# Patient Record
Sex: FEMALE | Race: BLACK OR AFRICAN AMERICAN | NOT HISPANIC OR LATINO | Employment: FULL TIME | URBAN - METROPOLITAN AREA
[De-identification: names, ages, dates, MRNs, and addresses within clinical notes are randomized per-mention and may not be internally consistent; named-entity substitution may affect disease eponyms.]

---

## 2019-10-28 ENCOUNTER — OFFICE VISIT (OUTPATIENT)
Dept: RHEUMATOLOGY | Facility: CLINIC | Age: 49
End: 2019-10-28
Payer: COMMERCIAL

## 2019-10-28 VITALS
HEIGHT: 61 IN | HEART RATE: 83 BPM | BODY MASS INDEX: 33.76 KG/M2 | WEIGHT: 178.81 LBS | RESPIRATION RATE: 16 BRPM | DIASTOLIC BLOOD PRESSURE: 72 MMHG | OXYGEN SATURATION: 98 % | TEMPERATURE: 97 F | SYSTOLIC BLOOD PRESSURE: 154 MMHG

## 2019-10-28 DIAGNOSIS — M32.19 SYSTEMIC LUPUS ERYTHEMATOSUS WITH OTHER ORGAN INVOLVEMENT, UNSPECIFIED SLE TYPE: ICD-10-CM

## 2019-10-28 DIAGNOSIS — I73.00 RAYNAUD'S DISEASE WITHOUT GANGRENE: ICD-10-CM

## 2019-10-28 DIAGNOSIS — R76.8 SMITH ANTIBODY POSITIVE: ICD-10-CM

## 2019-10-28 DIAGNOSIS — M48.07 LUMBOSACRAL STENOSIS: ICD-10-CM

## 2019-10-28 DIAGNOSIS — M70.61 TROCHANTERIC BURSITIS OF BOTH HIPS: ICD-10-CM

## 2019-10-28 DIAGNOSIS — E50.7 XEROPHTHALMIA: ICD-10-CM

## 2019-10-28 DIAGNOSIS — M75.52 BURSITIS OF BOTH SHOULDERS: ICD-10-CM

## 2019-10-28 DIAGNOSIS — M75.51 BURSITIS OF BOTH SHOULDERS: ICD-10-CM

## 2019-10-28 DIAGNOSIS — M32.19 SYSTEMIC LUPUS ERYTHEMATOSUS WITH OTHER ORGAN INVOLVEMENT, UNSPECIFIED SLE TYPE: Primary | ICD-10-CM

## 2019-10-28 DIAGNOSIS — D72.819 LEUKOPENIA, UNSPECIFIED TYPE: ICD-10-CM

## 2019-10-28 DIAGNOSIS — M70.62 TROCHANTERIC BURSITIS OF BOTH HIPS: ICD-10-CM

## 2019-10-28 PROBLEM — M79.7 FIBROMYOSITIS: Status: ACTIVE | Noted: 2019-10-28

## 2019-10-28 PROBLEM — R80.9 PROTEINURIA: Status: ACTIVE | Noted: 2019-10-28

## 2019-10-28 PROBLEM — M75.50 BURSITIS OF SHOULDER: Status: ACTIVE | Noted: 2019-10-28

## 2019-10-28 PROBLEM — M70.70 BURSITIS OF HIP: Status: ACTIVE | Noted: 2019-10-28

## 2019-10-28 PROBLEM — L65.9 ALOPECIA: Status: ACTIVE | Noted: 2019-10-28

## 2019-10-28 PROBLEM — M54.17 LUMBOSACRAL RADICULOPATHY: Status: ACTIVE | Noted: 2019-10-28

## 2019-10-28 PROBLEM — M32.9 SYSTEMIC LUPUS ERYTHEMATOSUS: Status: ACTIVE | Noted: 2019-10-28

## 2019-10-28 PROCEDURE — 99214 OFFICE O/P EST MOD 30 MIN: CPT | Mod: S$GLB,,, | Performed by: INTERNAL MEDICINE

## 2019-10-28 PROCEDURE — 3008F BODY MASS INDEX DOCD: CPT | Mod: CPTII,S$GLB,, | Performed by: INTERNAL MEDICINE

## 2019-10-28 PROCEDURE — 3008F PR BODY MASS INDEX (BMI) DOCUMENTED: ICD-10-PCS | Mod: CPTII,S$GLB,, | Performed by: INTERNAL MEDICINE

## 2019-10-28 PROCEDURE — 99214 PR OFFICE/OUTPT VISIT, EST, LEVL IV, 30-39 MIN: ICD-10-PCS | Mod: S$GLB,,, | Performed by: INTERNAL MEDICINE

## 2019-10-28 RX ORDER — GABAPENTIN 100 MG/1
100 CAPSULE ORAL 3 TIMES DAILY
Qty: 90 CAPSULE | Refills: 4 | Status: SHIPPED | OUTPATIENT
Start: 2019-10-28 | End: 2020-01-21

## 2019-10-28 RX ORDER — BUPROPION HYDROCHLORIDE 300 MG/1
300 TABLET ORAL DAILY
Refills: 0 | COMMUNITY
Start: 2019-09-30

## 2019-10-28 RX ORDER — TRAZODONE HYDROCHLORIDE 50 MG/1
TABLET ORAL
COMMUNITY

## 2019-10-28 RX ORDER — BUTALBITAL, ACETAMINOPHEN AND CAFFEINE 50; 325; 40 MG/1; MG/1; MG/1
1 TABLET ORAL 3 TIMES DAILY PRN
Refills: 0 | COMMUNITY
Start: 2019-08-19

## 2019-10-28 RX ORDER — PREDNISONE 10 MG/1
10 TABLET ORAL DAILY
Qty: 30 TABLET | Refills: 4 | Status: SHIPPED | OUTPATIENT
Start: 2019-10-28 | End: 2019-11-27

## 2019-10-28 RX ORDER — HYDROXYCHLOROQUINE SULFATE 200 MG/1
200 TABLET, FILM COATED ORAL 2 TIMES DAILY
Qty: 60 TABLET | Refills: 4 | Status: SHIPPED | OUTPATIENT
Start: 2019-10-28 | End: 2020-01-21

## 2019-10-28 RX ORDER — PREDNISONE 10 MG/1
10 TABLET ORAL DAILY
Qty: 30 TABLET | Refills: 4 | Status: SHIPPED | OUTPATIENT
Start: 2019-10-28 | End: 2019-10-28 | Stop reason: SDUPTHER

## 2019-10-28 RX ORDER — HYDROXYCHLOROQUINE SULFATE 200 MG/1
200 TABLET, FILM COATED ORAL 2 TIMES DAILY
Qty: 60 TABLET | Refills: 4 | Status: SHIPPED | OUTPATIENT
Start: 2019-10-28 | End: 2019-10-28 | Stop reason: SDUPTHER

## 2019-10-28 RX ORDER — GABAPENTIN 100 MG/1
100 CAPSULE ORAL 3 TIMES DAILY
Qty: 90 CAPSULE | Refills: 11 | Status: SHIPPED | OUTPATIENT
Start: 2019-10-28 | End: 2019-10-28 | Stop reason: SDUPTHER

## 2019-10-28 RX ORDER — TOPIRAMATE 100 MG/1
100 CAPSULE, EXTENDED RELEASE ORAL DAILY
COMMUNITY

## 2019-10-28 NOTE — PROGRESS NOTES
Subjective:       Patient ID: Vilma Gaffney is a 48 y.o. female.    Chief Complaint: Pain (Back and BLE pain at an 8 at night.)    HPINo acutely swollen tender painful inflammed joint . Legs shin area with achiness daily mainly at night with no burning, tinglin or numbness. Has low back pain  On a daily basis and at times with sciatica pain . No facial,or skin rashes, one episode of a sore in th mouth  With no pleuesy pain. Stopped   Taking Plaquenil  qand prednisone  10 mg when taking herbal products and has gradually noticed an increased of pain. No history of Plaquenikl ocular toxicity.        Current medications include:  Current Outpatient Medications on File Prior to Visit   Medication Sig Dispense Refill    topiramate (TROKENDI XR) 100 mg Cp24 Take 100 mg by mouth once daily.      buPROPion (WELLBUTRIN XL) 300 MG 24 hr tablet Take 300 mg by mouth once daily.  0    butalbital-acetaminophen-caffeine -40 mg (FIORICET, ESGIC) -40 mg per tablet Take 1 tablet by mouth 3 (three) times daily as needed.  0    traZODone (DESYREL) 50 MG tablet trazodone 50 mg tablet       No current facility-administered medications on file prior to visit.        Lab Results:  No results found for: WBC, RBC, HGB, HCT, MCV, COLORU, SPECGRAV, PHUR, WBCUR, NITRITE, GLUCOSEUR, KETONESU, BILIRUBINUR, RBCUR, NA, K, CL, CO2, GLU, BUN, CREATININE     Review of Systems   Constitutional: Positive for chills and diaphoresis.   HENT: Negative.    Eyes: Negative.    Respiratory: Negative.    Cardiovascular: Negative.    Gastrointestinal: Negative.    Endocrine: Negative.    Genitourinary: Negative.    Musculoskeletal: Positive for arthralgias and back pain.   Allergic/Immunologic: Positive for environmental allergies.   Neurological:        Burnimng and tingling in feet and numbness in hands   Hematological: Negative.    Psychiatric/Behavioral: Negative.          Objective:   BP (!) 154/72 (BP Location: Right arm, Patient Position:  "Sitting, BP Method: Medium (Manual))   Pulse 83   Temp 97 °F (36.1 °C) (Temporal)   Resp 16   Ht 5' 1" (1.549 m)   Wt 81.1 kg (178 lb 12.8 oz)   SpO2 98%   BMI 33.78 kg/m²      Physical Exam   Constitutional:   obesity   HENT:   Head: Normocephalic and atraumatic.   Dryness in eyes and mouth   Eyes: Conjunctivae are normal. Pupils are equal, round, and reactive to light.   Neck: Normal range of motion.   Cardiovascular: Normal rate and regular rhythm.    Pulmonary/Chest: Effort normal and breath sounds normal.   Abdominal: Soft. Bowel sounds are normal.   Neurological:   SLR- negative   Skin: Skin is warm and dry.     No skin rashes    Psychiatric: Mood, memory, affect and judgment normal.   Musculoskeletal:   She has tenderness in cervical and LS spinous processes and left hip Greater trochanter bursitis.           Assessment:       1. Systemic lupus erythematosus with other organ involvement, unspecified SLE type    2. Leukopenia, unspecified type    3. Bursitis of both shoulders    4. Trochanteric bursitis of both hips    5. Heller antibody positive    6. Raynaud's disease without gangrene    7. Xerophthalmia    8. Lumbosacral stenosis            Plan:       Becinning to have a   Flareup with SLE since off Plaquenil nd prednisone Has radiculopathy sxs in legs   "

## 2020-01-20 LAB
ABS NRBC COUNT: 0 X 10 3/UL (ref 0–0.01)
ABSOLUTE BASOPHIL: 0.01 X 10 3/UL (ref 0–0.22)
ABSOLUTE EOSINOPHIL: 0.03 X 10 3/UL (ref 0.04–0.54)
ABSOLUTE IMMATURE GRAN: 0.01 X 10 3/UL (ref 0–0.04)
ABSOLUTE LYMPHOCYTE: 0.84 X 10 3/UL (ref 0.86–4.75)
ABSOLUTE MONOCYTE: 0.31 X 10 3/UL (ref 0.22–1.08)
ALBUMIN SERPL-MCNC: 4.5 G/DL (ref 3.5–5.2)
ALBUMIN/GLOB SERPL ELPH: 1.4 {RATIO} (ref 1–2.7)
ALP ISOS SERPL LEV INH-CCNC: 74 U/L (ref 35–105)
ALT (SGPT): 13 U/L (ref 0–33)
AMORPH URATE CRY URNS QL MICRO: NEGATIVE
ANION GAP SERPL CALC-SCNC: 12 MMOL/L (ref 8–17)
AST SERPL-CCNC: 21 U/L (ref 0–32)
BACTERIA #/AREA URNS HPF: ABNORMAL /[HPF]
BASOPHILS NFR BLD: 0.3 % (ref 0.2–1.2)
BILIRUB UR QL STRIP: NEGATIVE
BILIRUBIN, TOTAL: 0.24 MG/DL (ref 0–1.2)
BUN/CREAT SERPL: 13.4 (ref 6–20)
CALCIUM SERPL-MCNC: 9.6 MG/DL (ref 8.6–10.2)
CARBON DIOXIDE, CO2: 22 MMOL/L (ref 22–29)
CHLORIDE: 108 MMOL/L (ref 98–107)
CLARITY UR: CLEAR
COLOR UR: YELLOW
CREAT SERPL-MCNC: 1.06 MG/DL (ref 0.5–0.9)
EOSINOPHIL NFR BLD: 0.8 % (ref 0.7–7)
EPITHELIAL CELLS: ABNORMAL
GFR ESTIMATION: 54.65
GLOBULIN: 3.2 G/DL (ref 1.5–4.5)
GLUCOSE (UA): NEGATIVE MG/DL
GLUCOSE: 85 MG/DL (ref 74–106)
HCT VFR BLD AUTO: 40 % (ref 37–47)
HGB BLD-MCNC: 13 G/DL (ref 12–16)
IMMATURE GRANULOCYTES: 0.3 % (ref 0–0.5)
KETONES UR QL STRIP: NEGATIVE MG/DL
LEUKOCYTE ESTERASE UR QL STRIP: ABNORMAL
LYMPHOCYTES NFR BLD: 23.5 % (ref 19.3–53.1)
Lab: NORMAL
MCH RBC QN AUTO: 27.1 PG (ref 27–32)
MCHC RBC AUTO-ENTMCNC: 32.5 G/DL (ref 32–36)
MCV RBC AUTO: 83.3 FL (ref 82–100)
MONOCYTES NFR BLD: 8.7 % (ref 4.7–12.5)
MUCOUS THREADS URNS QL MICRO: NEGATIVE
NEUTROPHILS ABSOLUTE COUNT: 2.37 X 10 3/UL (ref 2.15–7.56)
NEUTROPHILS NFR BLD: 66.4 %
NITRITE UR QL STRIP: NEGATIVE
NUCLEATED RED BLOOD CELLS: 0 /100 WBC (ref 0–0.2)
OCCULT BLOOD: NEGATIVE
PH, URINE: 7 (ref 5–7.5)
PLATELET # BLD AUTO: 274 X 10 3/UL (ref 135–400)
POTASSIUM: 3.8 MMOL/L (ref 3.5–5.1)
PROT SNV-MCNC: 7.7 G/DL (ref 6.4–8.3)
PROT UR QL STRIP: NEGATIVE MG/DL
RBC # BLD AUTO: 4.8 X 10 6/UL (ref 4.2–5.4)
RBC/HPF: ABNORMAL
RDW-SD: 41.6 FL (ref 37–54)
SED RATE (WESTERGREN): 20 MM/HR (ref 0–30)
SODIUM: 142 MMOL/L (ref 136–145)
SP GR UR STRIP: 1.01 (ref 1–1.03)
UREA NITROGEN (BUN): 14.2 MG/DL (ref 6–20)
URINE CULTURE, ROUTINE: NORMAL
UROBILINOGEN, URINE: NORMAL E.U./DL (ref 0–1)
WBC # BLD: 3.57 X 10 3/UL (ref 4.3–10.8)
WBC/HPF: ABNORMAL

## 2020-01-21 DIAGNOSIS — M32.19 SYSTEMIC LUPUS ERYTHEMATOSUS WITH OTHER ORGAN INVOLVEMENT, UNSPECIFIED SLE TYPE: ICD-10-CM

## 2020-01-21 DIAGNOSIS — M48.07 LUMBOSACRAL STENOSIS: ICD-10-CM

## 2020-01-21 LAB
C3 SERPL-MCNC: 106 MG/DL (ref 90–180)
C4 NEF SERPL-MCNC: 40 MG/DL (ref 10–40)
DSDNA ANTIBODY: NORMAL

## 2020-01-21 RX ORDER — GABAPENTIN 100 MG/1
CAPSULE ORAL
Qty: 270 CAPSULE | Refills: 3 | Status: SHIPPED | OUTPATIENT
Start: 2020-01-21

## 2020-01-21 RX ORDER — HYDROXYCHLOROQUINE SULFATE 200 MG/1
TABLET, FILM COATED ORAL
Qty: 180 TABLET | Refills: 3 | Status: SHIPPED | OUTPATIENT
Start: 2020-01-21 | End: 2020-01-28 | Stop reason: SDUPTHER

## 2020-01-23 LAB
CH50 SERPL-ACNC: >60 U/ML (ref 31–60)
DRVVT SCREEN: 41 SECONDS
LUPUS ANTICOAGULANT: NORMAL
PTT-LA SCREEN: 30 SECONDS

## 2020-01-28 ENCOUNTER — OFFICE VISIT (OUTPATIENT)
Dept: RHEUMATOLOGY | Facility: CLINIC | Age: 50
End: 2020-01-28
Payer: COMMERCIAL

## 2020-01-28 VITALS
RESPIRATION RATE: 16 BRPM | TEMPERATURE: 97 F | BODY MASS INDEX: 31.72 KG/M2 | DIASTOLIC BLOOD PRESSURE: 80 MMHG | SYSTOLIC BLOOD PRESSURE: 133 MMHG | HEIGHT: 61 IN | HEART RATE: 96 BPM | WEIGHT: 168 LBS

## 2020-01-28 DIAGNOSIS — M70.62 TROCHANTERIC BURSITIS OF BOTH HIPS: ICD-10-CM

## 2020-01-28 DIAGNOSIS — M75.52 BURSITIS OF BOTH SHOULDERS: ICD-10-CM

## 2020-01-28 DIAGNOSIS — M54.17 LUMBOSACRAL RADICULOPATHY: ICD-10-CM

## 2020-01-28 DIAGNOSIS — R80.9 PROTEINURIA, UNSPECIFIED TYPE: ICD-10-CM

## 2020-01-28 DIAGNOSIS — M32.19 SYSTEMIC LUPUS ERYTHEMATOSUS WITH OTHER ORGAN INVOLVEMENT, UNSPECIFIED SLE TYPE: Primary | ICD-10-CM

## 2020-01-28 DIAGNOSIS — D72.819 LEUKOPENIA, UNSPECIFIED TYPE: ICD-10-CM

## 2020-01-28 DIAGNOSIS — M75.51 BURSITIS OF BOTH SHOULDERS: ICD-10-CM

## 2020-01-28 DIAGNOSIS — I73.00 RAYNAUD'S DISEASE WITHOUT GANGRENE: ICD-10-CM

## 2020-01-28 DIAGNOSIS — E50.7 XEROPHTHALMIA: ICD-10-CM

## 2020-01-28 DIAGNOSIS — L65.9 ALOPECIA: ICD-10-CM

## 2020-01-28 DIAGNOSIS — R76.8 SMITH ANTIBODY POSITIVE: ICD-10-CM

## 2020-01-28 DIAGNOSIS — M70.61 TROCHANTERIC BURSITIS OF BOTH HIPS: ICD-10-CM

## 2020-01-28 PROCEDURE — 99214 PR OFFICE/OUTPT VISIT, EST, LEVL IV, 30-39 MIN: ICD-10-PCS | Mod: S$GLB,,, | Performed by: INTERNAL MEDICINE

## 2020-01-28 PROCEDURE — 99214 OFFICE O/P EST MOD 30 MIN: CPT | Mod: S$GLB,,, | Performed by: INTERNAL MEDICINE

## 2020-01-28 RX ORDER — DULOXETIN HYDROCHLORIDE 60 MG/1
60 CAPSULE, DELAYED RELEASE ORAL DAILY
COMMUNITY

## 2020-01-28 RX ORDER — PHENTERMINE HYDROCHLORIDE 37.5 MG/1
37.5 TABLET ORAL
COMMUNITY

## 2020-01-28 RX ORDER — HYDROXYCHLOROQUINE SULFATE 200 MG/1
200 TABLET, FILM COATED ORAL 2 TIMES DAILY
Qty: 180 TABLET | Refills: 3 | Status: SHIPPED | OUTPATIENT
Start: 2020-01-28

## 2020-01-28 NOTE — PROGRESS NOTES
Subjective:       Patient ID: Vilma Gaffney is a 49 y.o. female.    Chief Complaint: Follow-up (with lab results)    HPI Continue on Plaquenil 200 mg  BID had stopped Plaquenil 2 years ago when takiing the xooma products  But became too expensive. Restarted Plaquenil 3 months ago and no ocular toxicity and no facial skin rashes or any type of skin lesions, mno oral and nasal ulcers, with no acutely swollen tender joint. Fatigue onset when waking ujp and am stiffness in 30 min. Lower back not symptomatic. Hair growing back        Current medications include:  Current Outpatient Medications on File Prior to Visit   Medication Sig Dispense Refill    buPROPion (WELLBUTRIN XL) 300 MG 24 hr tablet Take 300 mg by mouth once daily.  0    butalbital-acetaminophen-caffeine -40 mg (FIORICET, ESGIC) -40 mg per tablet Take 1 tablet by mouth 3 (three) times daily as needed.  0    DULoxetine (CYMBALTA) 60 MG capsule Take 60 mg by mouth once daily.      gabapentin (NEURONTIN) 100 MG capsule TAKE 1 CAPSULE BY MOUTH THREE TIMES A  capsule 3    hydroxychloroquine (PLAQUENIL) 200 mg tablet TAKE 1 TABLET BY MOUTH TWICE A  tablet 3    phentermine (ADIPEX-P) 37.5 mg tablet Take 37.5 mg by mouth before breakfast.      topiramate (TROKENDI XR) 100 mg Cp24 Take 100 mg by mouth once daily.      traZODone (DESYREL) 50 MG tablet trazodone 50 mg tablet       No current facility-administered medications on file prior to visit.        Lab Results:  WBC   Date Value Ref Range Status   01/18/2020 3.57 (L) 4.3 - 10.8 X 10 3/ul Final     Hemoglobin   Date Value Ref Range Status   01/18/2020 13.0 12 - 16 g/dL Final     Hematocrit   Date Value Ref Range Status   01/18/2020 40.0 37 - 47 % Final     Color, UA   Date Value Ref Range Status   01/18/2020 YELLOW  Final     Ketones, UA   Date Value Ref Range Status   01/18/2020 NEGATIVE NEGATIVE mg/dL Final     Sodium   Date Value Ref Range Status   01/18/2020 142 136 - 145  "mmol/L Final     Potassium   Date Value Ref Range Status   01/18/2020 3.8 3.5 - 5.1 mmol/L Final     Chloride   Date Value Ref Range Status   01/18/2020 108 (H) 98 - 107 mmol/L Final     CO2   Date Value Ref Range Status   01/18/2020 22 22 - 29 mmol/L Final     BUN, Bld   Date Value Ref Range Status   01/18/2020 14.2 6 - 20 mg/dL Final     Creatinine   Date Value Ref Range Status   01/18/2020 1.06 (H) 0.50 - 0.90 mg/dL Final        Review of Systems   HENT:        Dryness in mouth   Eyes: Negative.    Respiratory: Negative.    Cardiovascular: Negative.    Gastrointestinal: Positive for nausea.        Heartburns   Endocrine: Negative.    Genitourinary: Negative.    Musculoskeletal: Positive for arthralgias and back pain.   Allergic/Immunologic: Positive for environmental allergies.   Neurological: Positive for numbness.        Numbness and tingling in hands and feet   Hematological: Negative.    Psychiatric/Behavioral: Negative.          Objective:   /80 (BP Location: Left arm, Patient Position: Sitting, BP Method: Large (Automatic))   Pulse 96   Temp 96.7 °F (35.9 °C) (Temporal)   Resp 16   Ht 5' 1" (1.549 m)   Wt 76.2 kg (168 lb)   BMI 31.74 kg/m²      Physical Exam   Constitutional: She is well-developed, well-nourished, and in no distress.   HENT:   Head: Normocephalic and atraumatic.   No dryness in eyes  And mouth    Eyes: Conjunctivae and EOM are normal. Pupils are equal, round, and reactive to light.   Neck: Normal range of motion.   Cardiovascular: Normal rate and regular rhythm.    Pulmonary/Chest: Effort normal and breath sounds normal.   Abdominal: Soft. Bowel sounds are normal.   Neurological: She is alert.   SLR=negative.   Skin: Skin is warm and dry. No rash noted.     Psychiatric: Mood, memory, affect and judgment normal.   Musculoskeletal:   Tenderness in neck , no acutely swollen tendder painful joint, hands with no synovial thickening in hands  Or tenderness , left ankle tender.         "   Assessment:       1. Systemic lupus erythematosus with other organ involvement, unspecified SLE type    2. Heller antibody positive    3. Raynaud's disease without gangrene    4. Leukopenia, unspecified type    5. Bursitis of both shoulders    6. Trochanteric bursitis of both hips    7. Alopecia    8. Xerophthalmia    9. Lumbosacral radiculopathy    10. Proteinuria, unspecified type            Plan:        will recheck Lupus parameters next visit as she takes plauenil

## 2020-04-27 ENCOUNTER — TELEPHONE (OUTPATIENT)
Dept: RHEUMATOLOGY | Facility: CLINIC | Age: 50
End: 2020-04-27

## 2020-05-06 LAB
ABS NRBC COUNT: 0 X 10 3/UL (ref 0–0.01)
ABSOLUTE BASOPHIL: 0.01 X 10 3/UL (ref 0–0.22)
ABSOLUTE EOSINOPHIL: 0.01 X 10 3/UL (ref 0.04–0.54)
ABSOLUTE IMMATURE GRAN: 0.01 X 10 3/UL (ref 0–0.04)
ABSOLUTE LYMPHOCYTE: 0.86 X 10 3/UL (ref 0.86–4.75)
ABSOLUTE MONOCYTE: 0.28 X 10 3/UL (ref 0.22–1.08)
ALBUMIN SERPL-MCNC: 4.4 G/DL (ref 3.5–5.2)
ALBUMIN/GLOB SERPL ELPH: 1.4 {RATIO} (ref 1–2.7)
ALP ISOS SERPL LEV INH-CCNC: 61 U/L (ref 35–105)
ALT (SGPT): 10 U/L (ref 0–33)
ANION GAP SERPL CALC-SCNC: 11 MMOL/L (ref 8–17)
AST SERPL-CCNC: 18 U/L (ref 0–32)
BACTERIA #/AREA URNS HPF: ABNORMAL /[HPF]
BASOPHILS NFR BLD: 0.3 % (ref 0.2–1.2)
BILIRUB UR QL STRIP: NEGATIVE
BILIRUBIN, TOTAL: 0.21 MG/DL (ref 0–1.2)
BUN/CREAT SERPL: 19 (ref 6–20)
CALCIUM SERPL-MCNC: 9.8 MG/DL (ref 8.6–10.2)
CARBON DIOXIDE, CO2: 24 MMOL/L (ref 22–29)
CHLORIDE: 110 MMOL/L (ref 98–107)
CLARITY UR: CLEAR
COLOR UR: YELLOW
CREAT SERPL-MCNC: 0.78 MG/DL (ref 0.5–0.9)
EOSINOPHIL NFR BLD: 0.3 % (ref 0.7–7)
EPITHELIAL CELLS: ABNORMAL
GFR ESTIMATION: 77.86
GLOBULIN: 3.1 G/DL (ref 1.5–4.5)
GLUCOSE (UA): NEGATIVE MG/DL
GLUCOSE: 83 MG/DL (ref 74–106)
HCT VFR BLD AUTO: 38.7 % (ref 37–47)
HGB BLD-MCNC: 12 G/DL (ref 12–16)
IMMATURE GRANULOCYTES: 0.3 % (ref 0–0.5)
KETONES UR QL STRIP: NEGATIVE MG/DL
LEUKOCYTE ESTERASE UR QL STRIP: ABNORMAL
LYMPHOCYTES NFR BLD: 24.1 % (ref 19.3–53.1)
Lab: NORMAL
MCH RBC QN AUTO: 27.1 PG (ref 27–32)
MCHC RBC AUTO-ENTMCNC: 31 G/DL (ref 32–36)
MCV RBC AUTO: 87.6 FL (ref 82–100)
MONOCYTES NFR BLD: 7.8 % (ref 4.7–12.5)
MUCOUS THREADS URNS QL MICRO: NEGATIVE
NEUTROPHILS ABSOLUTE COUNT: 2.4 X 10 3/UL (ref 2.15–7.56)
NEUTROPHILS NFR BLD: 67.2 %
NITRITE UR QL STRIP: NEGATIVE
NUCLEATED RED BLOOD CELLS: 0 /100 WBC (ref 0–0.2)
OCCULT BLOOD: NEGATIVE
PH, URINE: 7 (ref 5–7.5)
PLATELET # BLD AUTO: 258 X 10 3/UL (ref 135–400)
POTASSIUM: 4 MMOL/L (ref 3.5–5.1)
PROT SNV-MCNC: 7.5 G/DL (ref 6.4–8.3)
PROT UR QL STRIP: NEGATIVE MG/DL
RBC # BLD AUTO: 4.42 X 10 6/UL (ref 4.2–5.4)
RBC/HPF: NEGATIVE
RDW-SD: 46.5 FL (ref 37–54)
SED RATE (WESTERGREN): 16 MM/HR (ref 0–30)
SODIUM: 145 MMOL/L (ref 136–145)
SP GR UR STRIP: 1.01 (ref 1–1.03)
UREA NITROGEN (BUN): 14.8 MG/DL (ref 6–20)
UROBILINOGEN, URINE: NORMAL E.U./DL (ref 0–1)
WBC # BLD: 3.57 X 10 3/UL (ref 4.3–10.8)
WBC/HPF: ABNORMAL

## 2020-05-07 LAB
C3 SERPL-MCNC: 96 MG/DL (ref 90–180)
C4 NEF SERPL-MCNC: 31 MG/DL (ref 10–40)
DSDNA ANTIBODY: NORMAL

## 2020-05-10 LAB
CH50 SERPL-ACNC: >60 U/ML (ref 31–60)
DRVVT SCREEN: 32 SECONDS
LUPUS ANTICOAGULANT: NORMAL
PTT-LA SCREEN: 32 SECONDS